# Patient Record
Sex: FEMALE | Race: WHITE | NOT HISPANIC OR LATINO | URBAN - METROPOLITAN AREA
[De-identification: names, ages, dates, MRNs, and addresses within clinical notes are randomized per-mention and may not be internally consistent; named-entity substitution may affect disease eponyms.]

---

## 2017-08-02 ENCOUNTER — GENERIC CONVERSION - ENCOUNTER (OUTPATIENT)
Dept: OTHER | Facility: OTHER | Age: 32
End: 2017-08-02

## 2018-01-10 NOTE — PSYCH
Progress Note  Individual Psychotherapy 60 min minutes provided today  Goals addressed in session:   GOALS- DEVELOPING A [address] MEDS  Therapist explained to Brennon that Johnny Alvarez is no longer with [facility]  Brennon was upset to hear this and worried that her Focalin, which has been helping her function, will not be prescribed by her new PCP  THerapist suggested that Brennon see a Ojnh Costain these meds to be prescribed  Brennon agreed  Brennon shares that she is not babysitting her Thersa Tobin any more since her sister in law was fired from her job  HOwever, she still has concerns about her ability to care For her children in an appropriate manner  SHe and Gen will be monitoring the situation  Current suicide risk is low   Diagnosis and Treatment Plan explained to patient, patient relates understanding diagnosis and is agreeable to Treatment Plan  Assessment  Mood has been stable, however, Brennon continues to struggle to remain focused to complete tasks since out of her ADD medication  Plan  See in 2 weeks- therapist will refer Brennon to a [facility] practice in hopes she can be seen and treated for her ADD and bipolar  Signatures   Electronically signed by :  Elsy Sellers [place]; Oct  6 0239  9:52PM EST                       (Author)
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
## 2018-01-10 NOTE — PSYCH
Progress Note  Individual Psychotherapy 60 min minutes provided today  Goals addressed in session:   GOALS- processing and exploring how to do self care while working on marriage  Whit's mood is improved, less depressed and more hopeful  Claudeen Mt shares that she and [facility] had a very productive marriage Counseling session last week- they were able to identify the pattern of their miscommunication  [facility] was able able to accept that it was not time yet for Claudeen Mt and the kids to move back home  Claudeen Mt shares that she realized how much her mother and her childhood has impacted her current state of anxiety and depression  Explored with Claudeen Mt some of the difficulties she had to face as a child with her mom who "wasn't paying attention"  Claudeen Mt identifies that she wants to do more work on her childhood trauma  Current suicide risk is low   Assessment  Mood is stable in spite of significant external stress  Claudeen Mt has gained insight into some of her feelings that are related to her childhood  Plan  See weekly- Claudeen Mt will also continue to attend marital therapy with [facility] at this time  Signatures   Electronically signed by :  Kanika Hylton [place]; Jan 27 4248 10:11PM EST                       (Author)
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
## 2018-01-13 NOTE — PSYCH
Progress Note  Individual Psychotherapy 60 min minutes provided today  Goals addressed in session:   GOALS- Healthy Boundaries and self-care  Joey Winn updates therapist on the events which took place up to and then after the birth Khadijah Winn shares how much it helped to being back on Latuda right after giving birth- that her mood has been stable as a result  Joey Winn has also been taking a medication for her ADD, which has also been very helpful in staying focused and being able to follow through n tasks  Joey Winn shares that she was at first prescribed this 2X/day but then it wasn't approved except for 1X/day  However, Joey Winn has found that she need to take this 2X/day to be effective, as was originally prescribed  Therapist will discuss with Eve Coley  Joey Winn shares about how well she has been handling a lot of stressors, which is an indiction that the medication is really working  Joey Winn shares that she and Jamar Garnerila have decided that need to help out his sister, Albaro Liu, and her children, due to her recent break up and her failing at taking care of them  This has caused Joey Winn a lot of stress, however, she states she is up for the task  What Joey Winn is concerned about is at one point do they confront Albaro Liu and insist on her making changes  Discussed options  Current suicide risk is low   Assessment  Mood is stable with some anxiety related to circumstances  Plan  Agreed that biwkly sessions are sufficient- will develop a new TX plan in next session  Signatures   Electronically signed by :  Whitney Johnson; Sep 24 7310  9:20PM EST                       (Author)
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
## 2018-01-14 NOTE — PSYCH
Progress Note  Individual Psychotherapy 60 min minutes provided today  Goals addressed in session:   GOALS- Healthy boundaries and self-care  [facility] that [facility] is continuing to address concerns that arise in the week, in the marital sessions, and this is going well  However, Cherry Aquino is continuing to communicate with other women and is justifying it  [facility] shares that the marital therapist has been able to confront Cherry Aquino appropriately, which is validating and relieving for [facility]  [facility] has also learned not to take Franki's sexual obsessions with other women personally  [facility] discusses issues with her mother which still impact her- she recalls how her mom was not present for her emotionally and ow her needs when unmet, as a result  Agreed to focus more on these issues in therapy  Assessment   Mood is stable- Mary Lou Haylee will talk with her gyn about prescribing [place] to take right after giving birth- as per her PCP- the gyn will need to make this decision  Plan  See weekly up until she gives birth  Signatures   Electronically signed by :  Jaycob Tong [place]; May  6 2422  9:30PM EST                       (Author)

## 2018-01-14 NOTE — PSYCH
Progress Note  Individual Psychotherapy 60 min minutes provided today  Goals addressed in session:   GOALS- CONTINUING WITH SELF CARE WHILE WORKING ON MARRIAGE  Rubenss affect is bright and she reports feeling good, emotionally and better physically  Mary Lou Salomon has been improving her fluid intake and eating better, and she is noticing a difference  [place] shares that things are still going well with Lis Rod, since has moved back home  Mary Lou Salomon shares that there were a number of examples in which Lis Rod demonstrated being sensitive and took responsibility for his action, when there were minor conflicts which arose  Mary Lou Salomon relays that Lis Marcel respected her wishes about not interacting w his parents at this time, and he did not push this issue or blame her  Mary Lou Salomon shares that she is ready to work on other issues from her childhood at this point, as Mary Lou Salomon feels confident in she and agustina to address issues in their relationship in their marital therapy sessions  Mary Lou Salomon identifies a lot of anger towards her mother and her neglect growing up  Current suicide risk is low   Assessment   Whit's mood is stable and she the marriage is stable enough that she is able to redirect her focus on her childhood issues  Plan   See in 1 week- begin to prepare to process events from childhood  Signatures   Electronically signed by :  Whitney Rosales; Feb 28 7372  9:04PM EST                       (Author)

## 2018-01-15 NOTE — PSYCH
Progress Note  Individual Psychotherapy minutes provided today  Goals addressed in session:   [address] and completing tasks  Erlinda Tom shares that things have been going generally well between she and Johnson Vizcaino- they are making progress as they attend marital therapy  Erlinda Tom shares that she is struggling to get things done, and she describes her pattern of becoming distracted and not being able to focus and complete tasks- she then becomes overwhelmed as she starts several tasks and does not complete any- for this reason-all tasks seem overwhelming as she knows how her attempts will fail  Erlinda Tom identifies this issue as related to her ADD, which she did find effective medication for however, this is not an option while she is pregnant  Therapist led Erlinda Tom into an exercise in which she broke down her goals into smaller, specific steps, she lowered her expectations, and she gave herself extra time to meet each step  Therapist helped Erlinda Tom identify her tendency to image the "final goal" as something grander than is reasonable and unattainable  Erlinda Tom felt better after discussing this issue and breaking down a possible process to use to meet her goals  Current suicide risk is low   Assessment    1  ADHD (attention deficit hyperactivity disorder), combined type (314 01) (F90 2)    Mood is stable except for feeling frustrated with her ADD symptoms  Erlinda Tom is able to identify the difference between mert and her lack of motivation due to issues with her ADD and she affirms that she she is not depressed  Plan   See in 1week- follow up with plan to use this process this week  Signatures   Electronically signed by :  Yusuf Bourne [place]; Mar  4 7953 10:08PM EST                       (Author)
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
## 2018-01-16 NOTE — PSYCH
Progress Note  Individual Psychotherapy 60 min minutes provided today  Goals addressed in session:   GOALS- Setting healthy boundaries  Yudi Doll shares that she has been feeling very anxious lately and overwhelmed- she is unsure if it is her meds or her situation  Yudi Doll shares that the appt with the psychiatrist went very well, and she is now on [place], Focalin, and Trazodone For sleep  Yudi Doll shares how her sister in law is still not properly caring For the children, and Yudi Doll is the one who is taking on extra responsibility with her children to be sure that they are dressed Properly and paid attention to  Yudi Doll is feeling frustrated and torn about what to do- she fears that [facility] not allow her to see the children if they call CYF on her  Discussed the poss of having an intervention with the rest of the family- Yudi Doll will talk to Jorge Peterson about this  IN the meantime, therapist affirmed to Yudi Doll that she may need to become less involved o that she does not end up in an unhealthy emotioanl state herself  Current suicide risk is low   Assessment  Mood is anxious and angry about situation  Plan  See in2 weeks- support Yudi Doll being assertive with Jorge Peterson and his Family about limitations for her own emotional welfare  Signatures   Electronically signed by :  Whitney Torre; Nov 4 3375  9:38PM EST                       (Author)
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
## 2018-03-07 NOTE — PSYCH
Treatment Plan    Date of Initial Treatment Plan: 1/19/2015  Date of Current Treatment Plan: 5/11/2016  Treatment Plan 4  Strengths/Personal Resources for Self Care: RESILIENT, RESOURCEFUL, LOVE FOR FAMILY, JANAY, Perseverant, INSIGHTFUL, CARING,  HARD WORKER, DETERMINED, INTELLIGENT, GREAT WITH CHILDREN  Diagnosis:   Axis I: BIPOLAR DISORDER, ANXIETY DISORDER, NOS   Axis II: NONE   Axis III: pregnant     Current Challenges/Problems/Needs: UNRESOLVED ISSUES IN MARRIAGE, EXTREME FATIGUE, SOCIAL ANXIETY,  SOME UNRESOLVED ISSUES FROM CHILDHOOD, HISTORY OF LABILE MOODS  Long Term Goals:   "TO LIVE DAY TO DAY COMFORTABLY WITH MY MOODS/ AND WITH THE RIGHT MEDICATION"  TO BE ABLE TO FINISH TASKS MOST OF THE TIME   Target Date: 12/31/2016      SET AND ENFORCE HEALTHY BOUNDARIES IN MARRIAGE AND WITH OTHER FAMILY MEMBERS   TAKE STEPS FOR SELF- CARE AS NEEDED   Target Date: 12/31/2016      ADDRESS ISSUES FROM PAST AS NEEDED, TAKE STEPS TOWARDS RESOLUTION  ADDRESS ISSUES FROM PAST AS NEEDED   Target Date: 12/31/2016      Short Term Objectives:   Goal 1:   THERAPIST AND PCP WITH COMMUNICATE WITH GYM ABOUT RESUMING MEDICATION ASAP AFTER BIRTH  CHAS AND THERAPIST WILL ASSESS ADD SYMPTOMS AND DISCUSSES RESUMING   CONTINUE TO SET AND ACCOMPLISH ONE GOAL FOR THE DAY  Target Date: 12/31/2016      Goal 2:   CONTINUE TO REMAIN CALM ABOUT ISSUES WITH JOSE AND HOLD HIM ACCOUNTABLE- ADDRESS ISSUES WITH MARITAL THERAPIST AS NEEDED  FIND WAYS TO IMPROVE SELF ESTEEM  Target Date: 12/31/2016      Goal 3:   ADDRESSING CHILDHOOD TRAUMA SO THAT WHEN TALKING TO MOM, STRESS LEVEL IS A 5 OR LESS MOST OF THE TIME  Target Date: 12/31/2016      GOAL 1: Modality: Individual 4 x per month Target Date: 59/11/2016         GOAL 2: Modality: Individual 4 x per month Target Date: 9/11/206         GOAL 3: Modality: Individual 4 x per month Target Date: 9/11/2016         The first scheduled review date is 9/11/2016     The expected length of service is 12-18 MONTHS  Patient Signature: _________________________________ Date/Time: ______________      Active Problems    1  Abdominal pain, left upper quadrant (789 02) (R10 12)   2  Abdominal pregnancy (633 00) (O00 0)   3  ADHD (attention deficit hyperactivity disorder), combined type (314 01) (F90 2)   4  Allergic rhinitis (477 9) (J30 9)   5  Bipolar affective disorder, rapid cycling (296 80) (F31 9)   6  Birth Control Method - Oral Contraceptives   7  Choledocholithiasis (574 50) (K80 50)   8  Depression (311) (F32 9)   9  Depression with anxiety (300 4) (F41 8)   10  Fatigue (780 79) (R53 83)   11  Flu vaccine need (V04 81) (Z23)   12  Hypoglycemia (251 2) (E16 2)   13  Sinusitis (473 9) (J32 9)   14  Streptococcal infection (041 00) (A49 1)   15  Streptococcal pharyngitis (034 0) (J02 0)   16  Vitamin D deficiency (268 9) (E55 9)      ADHD (attention deficit hyperactivity disorder), combined type (314 01) (F90 2)          Signatures   Electronically signed by :  Whitney Rosales; May 11 0034 10:51AM EST                       (Author)

## 2018-03-07 NOTE — PSYCH
Treatment Plan    Date of Initial Treatment Plan: 1/19/2015  Date of Current Treatment Plan: 2/24/2016  Treatment Plan 3  Strengths/Personal Resources for Self Care: RESILIENT, RESOURCEFUL, LOVE FOR FAMILY, JANAY, Perseverant, INSIGHTFUL, CARING,  HARD WORKER, DETERMINED, INTELLIGENT, GREAT WITH CHILDREN  Diagnosis:   Axis I: BIPOLAR DISORDER, ANXIETY DISORDER, NOS   Axis II: NONE   Axis III: pregnant     Current Challenges/Problems/Needs: UNRESOLVED ISSUES IN MARRIAGE, EXTREME FATIGUE, SOCIAL ANXIETY,  SOME UNRESOLVED ISSUES FROM CHILDHOOD, HISTORY OF LABILE MOODS  Long Term Goals:   "TO FEEL/NORMAL ON OR OFF THE MEDS"  TO BE ABLE TO FINISH TASKS MOST OF THE TIME   Target Date: 9/13/2016      SET AND ENFORCE HEALTHY BOUNDARIES IN MARRIAGE AND WITH OTHER FAMILY MEMBERS   TAKE STEPS FOR SELF- CARE AS NEEDED   Target Date: 9/13/2016      ADDRESS ISSUES FROM PAST AS NEEDED, TAKE STEPS TOWARDS RESOLUTION  ADDRESS ISSUES FROM PAST AS NEEDED   Target Date: 9/13/2016      Short Term Objectives:   Goal 1:   CONTINUE TO WORK WITH HEALTH CARE PROVIDERS TO IMPROVE HEALTH  FOLLOW UP WITH APPTS WITH PCP AND GYN AND RECOMMENDED TESTS  INCREASE INTAKE OF PROTEIN AND WATER AND BEGIN TO EXERCISE ON A REGULAR BASIS  IDENTIFY GOALS AND WRITE SPECIFIC STEPS AND IDENTIFY A DEADLINE  Target Date: 1/13/17   Completion Date: Mostly accomplished 4/2016     Goal 2:   CONTINUE WITH MARITAL THERAPY AS NEEDED  FIND WAYS TO IMPROVE SELF ESTEEM  Target Date: 1/13/17   Completion Date: martial therapy goal accomplisched/ slef- esteem contionue goal 5/2016     Goal 3:   DECIDE ON SPECIFIC TRAUMATIC ISSUES TO ADDRESS IN THERAPY AND BEGIN PROCESS     Target Date: 1/13/17   Completion Date: ongoing      GOAL 1: Modality: Individual 4 x per month Target Date: 5/13/16         GOAL 2: Modality: Individual 4 x per month Target Date: 5/13/16         GOAL 3: Modality: Individual 4 x per month Target Date: 5/13/16         The first scheduled review date is 5/13/16  The expected length of service is 12-18 MONTHS  Level of functioning at initial assessment: 65  The highest level of functioning in the past year was 72  The current level of functioning is 65  Patient Signature: _________________________________ Date/Time: ______________      Active Problems     1  Abdominal pain, left upper quadrant (789 02) (R10 12)   2  Abdominal pregnancy (633 00) (O00 0)   3  Allergic rhinitis (477 9) (J30 9)   4  Bipolar affective disorder, rapid cycling (296 80) (F31 9)   5  Birth Control Method - Oral Contraceptives   6  Choledocholithiasis (574 50) (K80 50)   7  Depression (311) (F32 9)   8  Depression with anxiety (300 4) (F41 8)   9  Fatigue (780 79) (R53 83)   10  Flu vaccine need (V04 81) (Z23)   11  Hypoglycemia (251 2) (E16 2)   12  Sinusitis (473 9) (J32 9)   13  Streptococcal infection (041 00) (A49 1)   14  Streptococcal pharyngitis (034 0) (J02 0)   15  Vitamin D deficiency (268 9) (E55 9)    ADHD (attention deficit hyperactivity disorder), combined type (314 01) (F90 2)          Signatures   Electronically signed by : Whitney Ochoa; Mar  9 3350 10:56AM EST                       (Author)    Electronically signed by :  Whitney Ochoa; May 11 4477 10:15AM EST                       (Author)

## 2018-03-07 NOTE — PSYCH
Treatment Plan Tracking    #1 Treatment Plan not completed within required time limits due to: Other: Client was seen for the first time in 4 months on 9/21/2016, due to pregnancy and scheduloing issues  It was clinically more apporpriate to become updated on events over the last 4 months and wait until the next appt to complete the [address]             Signatures   Electronically signed by :  Debbora Dakin, [place]; Oct  5 1535 12:40PM EST                       (Author)
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
## 2018-03-07 NOTE — PSYCH
Treatment Plan Tracking    #1 Treatment Plan not completed within required time limits due to: Other: [address] NOT COMPLETED WHEN DUE ON 9/11/16, DUE TO LAPSE BETWEEN MAY AND SEPTEMBER DUE TO PREGNANCY- HTE 1ST APPT AFTER [facility] ON 9/21/16 WAS NEEDED TO BECOME UPDATED ON ISSUES AND CONCERNS, AS OF 10/5/16- RESET 4 MONTHS  Signatures   Electronically signed by :  Whitney Carl; Oct 31 1191  3:37PM EST                       (Author)

## 2018-03-07 NOTE — PSYCH
Treatment Plan    Date of Initial Treatment Plan: 1/19/2015  Date of Current Treatment Plan: 10/5/2016  Treatment Plan 5  Strengths/Personal Resources for Self Care: RESILIENT, RESOURCEFUL, LOVE FOR FAMILY, JANAY, Perseverant, INSIGHTFUL, CARING,  HARD WORKER, DETERMINED, INTELLIGENT, GREAT WITH CHILDREN  Diagnosis:   Axis I: BIPOLAR DISORDER, ANXIETY DISORDER, NOS   Axis II: NONE       Long Term Goals:   "TO LIVE DAY TO DAY COMFORTABLY WITH MY MOODS/ AND WITH THE RIGHT MEDICATION"  TO BE ABLE TO FINISH TASKS MOST OF THE TIME   Target Date: 6/11/2017      SET AND ENFORCE HEALTHY BOUNDARIES IN MARRIAGE AND WITH OTHER FAMILY MEMBERS   TAKE STEPS FOR SELF- CARE AS NEEDED   Target Date: 6/11/2017      ADDRESS ISSUES FROM PAST AS NEEDED, TAKE STEPS TOWARDS RESOLUTION  ADDRESS ISSUES FROM PAST AS NEEDED   Target Date: 6/11/2017    Short Term Objectives:   Goal 1:   FOLLOW THROUGH WITH OBTAINING A PSYCHIATRIST TO CONTINUE WITH EFFECTIVE MEDS  CHAS WILL WRITE DOWN ON BOARD IN KITCHEN AT LEAST 1-2 SPECIFIC THINGS TO ACCOMPLISH EACH DAY  Target Date: 1/11/2017      Goal 2:   CONTINUE TO ADDRESS WITH ISSUES IN MARRIAGE AS THEY ARISE  CONTINUE TO USE COPING SKILLS LEARNED IN MARITAL THERAPY  SET AND ENFORCE SPECIFIC EXPECTATIONS WITH OTHER FAMILY MEMBERS  START TO EXERCISE AT LEAST 2x/WEEK  Target Date: 1/11/2017      Goal 3:   ADDRESSING CHILDHOOD TRAUMA SO THAT WHEN TALKING TO MOM, STRESS LEVEL IS A 5 OR LESS MOST OF THE TIME  TRY USING EMDR TO ADDRESS PAST TRAUMA  Target Date: 1/11/2017      GOAL 1: Modality: Individual 2 x per month Target Date: 1/11/2017         GOAL 2: Modality: Individual 2 x per month Target Date: 1/11/2017         GOAL 3: Modality: Individual 2 x per month Target Date: 1/11/2017             The first scheduled review date is 1/11/2017  The expected length of service is 12-18 MONTHS                 Patient Signature: _________________________________ Date/Time: ______________ Active Problems    1  Abdominal pain, left upper quadrant (789 02) (R10 12)   2  Abdominal pregnancy (633 00) (O00 00)   3  ADHD (attention deficit hyperactivity disorder), combined type (314 01) (F90 2)   4  Allergic rhinitis (477 9) (J30 9)   5  Bipolar affective disorder, rapid cycling (296 80) (F31 9)   6  Birth Control Method - Oral Contraceptives   7  Choledocholithiasis (574 50) (K80 50)   8  Depression (311) (F32 9)   9  Depression screen (V79 0) (Z13 89)   10  Depression with anxiety (300 4) (F41 8)   11  Fatigue (780 79) (R53 83)   12  Flu vaccine need (V04 81) (Z23)   13  Hypoglycemia (251 2) (E16 2)   14  Sinusitis (473 9) (J32 9)   15  Streptococcal infection (041 00) (A49 1)   16  Streptococcal pharyngitis (034 0) (J02 0)   17  Vitamin D deficiency (268 9) (E55 9)        ADHD (attention deficit hyperactivity disorder), combined type (314 01) (F90 2)          Signatures   Electronically signed by :  Whitney Nance; Oct  5 9600 10:50AM EST                       (Author)

## 2020-08-03 ENCOUNTER — OFFICE VISIT (OUTPATIENT)
Dept: URGENT CARE | Facility: CLINIC | Age: 35
End: 2020-08-03
Payer: COMMERCIAL

## 2020-08-03 VITALS
BODY MASS INDEX: 32.78 KG/M2 | WEIGHT: 185 LBS | RESPIRATION RATE: 16 BRPM | HEART RATE: 82 BPM | HEIGHT: 63 IN | OXYGEN SATURATION: 99 % | TEMPERATURE: 97.8 F

## 2020-08-03 DIAGNOSIS — Z20.822 SUSPECTED COVID-19 VIRUS INFECTION: Primary | ICD-10-CM

## 2020-08-03 DIAGNOSIS — J20.9 ACUTE BRONCHITIS, UNSPECIFIED ORGANISM: ICD-10-CM

## 2020-08-03 PROCEDURE — G0382 LEV 3 HOSP TYPE B ED VISIT: HCPCS | Performed by: NURSE PRACTITIONER

## 2020-08-03 PROCEDURE — U0003 INFECTIOUS AGENT DETECTION BY NUCLEIC ACID (DNA OR RNA); SEVERE ACUTE RESPIRATORY SYNDROME CORONAVIRUS 2 (SARS-COV-2) (CORONAVIRUS DISEASE [COVID-19]), AMPLIFIED PROBE TECHNIQUE, MAKING USE OF HIGH THROUGHPUT TECHNOLOGIES AS DESCRIBED BY CMS-2020-01-R: HCPCS | Performed by: NURSE PRACTITIONER

## 2020-08-03 PROCEDURE — S9083 URGENT CARE CENTER GLOBAL: HCPCS | Performed by: NURSE PRACTITIONER

## 2020-08-03 RX ORDER — ALBUTEROL SULFATE 90 UG/1
2 AEROSOL, METERED RESPIRATORY (INHALATION) EVERY 4 HOURS PRN
Qty: 18 G | Refills: 1 | Status: SHIPPED | OUTPATIENT
Start: 2020-08-03 | End: 2021-04-16 | Stop reason: SDDI

## 2020-08-03 RX ORDER — DEXMETHYLPHENIDATE HYDROCHLORIDE 40 MG/1
1 CAPSULE, EXTENDED RELEASE ORAL DAILY
COMMUNITY
Start: 2016-08-29 | End: 2021-04-16 | Stop reason: SDDI

## 2020-08-03 RX ORDER — LURASIDONE HYDROCHLORIDE 40 MG/1
1 TABLET, FILM COATED ORAL DAILY
COMMUNITY
Start: 2020-07-21 | End: 2021-04-16 | Stop reason: SDDI

## 2020-08-03 NOTE — LETTER
August 3, 2020     Patient: Jeremy Gr   YOB: 1985   Date of Visit: 8/3/2020       To Whom It May Concern: It is my medical opinion that Ebony Backer should remain out of work until [employer] received  If you have any questions or concerns, please don't hesitate to call           Sincerely,        JUANIS Rivera    CC: No Recipients

## 2020-08-03 NOTE — PROGRESS NOTES
[address] [employer] [address]        NAME: Yandy Castro is a 36 y o  female  : 1985    MRN: [id number]  DATE: August 3, 2020  TIME: 12:03 PM    Assessment and Plan   Suspected Covid-19 Virus Infection [Z20 828]  1  Suspected Covid-19 Virus Infection  albuterol (Ventolin HFA) 90 mcg/act inhaler    Novel Coronavirus (COVID-19), PCR [facility] - Office Collection   2  Acute bronchitis, unspecified organism  albuterol (Ventolin HFA) 90 mcg/act inhaler    Novel Coronavirus (COVID-19), PCR [facility] - Office Collection         Patient Instructions     Patient Instructions     Treat yourself as if you have covid-19 until told otherwise  We do call results, but using [employer] to view results is often the easiest and fastest way (you can set up email notifications for new results)--see the handout to register  If you need to seek additional care (PCP, ER, 911), call ahead/call from parking lot and let them know you have been tested (this lets them wear more protection and take you directly to the exam room once ready)  Use the albuterol inhaler every 4 hours as needed for shortness of breath, chest tightness, wheezing or persistent cough  Since you rated your shortness of breath 2/10, the inhaler may be enough  If your symptoms worsen, you may need an antibiotic as discussed  Novel Coronavirus   AMBULATORY CARE:   The novel coronavirus (nCoV)  is a new strain (type) of coronavirus  Coronaviruses often cause mild respiratory (lung) infections, such as the common cold  They can also cause more severe infections  Examples include acute respiratory syndrome (SARS), Middle East respiratory syndrome (MERS), pneumonia, and bronchitis  The nCoV can cause more severe symptoms than earlier coronaviruses  The nCoV was first found in individuals in part of [place] at the end of   The virus is spreading as people who are infected travel to other parts of the world    Signs and symptoms of nCoV  can take 2 to 14 days to develop and range from mild to severe:  · Fever    · Cough    · Shortness of breath or trouble breathing    · Pneumonia (in severe cases)    Call your local emergency number (911 in the [address]) for any of the following:  Tell the  you may have nCoV  This will help anyone in the ambulance stay safe, and to call ahead to the hospital   · You have sudden shortness of breath  · You have a fast heartbeat or chest pain  Seek care immediately if:   · You feel so dizzy that you have trouble standing up  · Your lips and fingernails turn blue  Call your doctor if:   · You have a fever over 102ºF (39ºC)  · Your sore throat gets worse or you see white or yellow spots in your throat  · Your symptoms get worse after 3 to 5 days or your cold is not better in 14 days  · You have a rash anywhere on your skin  · You have large, tender lumps in your neck  · You have thick, green, or yellow drainage from your nose  · You cough up thick yellow, green, or bloody mucus  · You are vomiting for more than 24 hours and cannot keep fluids down  · You have a bad earache  · You have questions or concerns about your condition or care  How nCoV spreads: It is not known for sure how the virus spreads  The virus may spread in droplets when an infected person coughs or sneezes  Others become infected by breathing in the virus or getting the virus in their eyes  The virus can also possibly spread if a person touches certain animals, such as in a live market  If you develop symptoms of nCoV,  you may need to be checked  Call your doctor if you traveled within the past 14 days to an area where nCoV is active  Call your doctor if you have close contact with someone else who did  Your doctor will tell you what to do  He or she will need to take precautions in the office to protect staff members and other patients  Your doctor will take samples from your airway   The samples have to be sent to the [facility] for Disease Control and Prevention (CDC) to be tested  What to do if you have nCoV:  No treatment is available for nCoV  Medicine may be given to help relieve your cough or fever, or to help you breathe more easily  Severe nCoV may need to be treated in the hospital  In the hospital, you may need breathing treatment or support, such as extra oxygen  The following can help you prevent spreading nCoV to others  Have anyone you are caring for who has nCoV also use the guidelines  · Limit close contact with others  Stay in a different room, or sleep in a separate bed  Remind others to stay at least 6 feet (2 meters) away from you while you are sick  Only go out of your house if you are going to a medical appointment  While you are recovering, always call the office of any healthcare provider you seeing  The provider will need to prepare for your arrival to keep others safe  · Wear a medical mask when you are around others  A medical mask will help prevent you from spreading the virus  You can ask others around you to wear a medical mask if you are not able to wear one  · Cover your mouth and nose to sneeze or cough  Sneeze and cough into a tissue that covers your mouth and nose  Use the bend of our arm if you do not have a tissue  Throw the tissue away in a trash can right away  Then wash your hands well with soap and water  Use hand  that contains alcohol if you cannot wash your hands  · Wash your hands often  You and everyone in your home must wash your hands throughout the day  Use soap and water  Use germ-killing gel if soap and water are not available  Do not touch your eyes or mouth if you have not washed your hands  · Do not share items with other people  Do not share dishes, towels, or other items with anyone  Always wash items after you use them  · Clean frequently touched surfaces often    Use household  or bleach diluted with water to clean counters, doorknobs, toilet seats, and other surfaces  · Do not handle live animals  You may be able to spread nCoV to animals, including pets  Until more is known, it is best not to touch, play with, or handle live animals while you are sick  Help relieve mild symptoms:   · Drink more liquids as directed  Liquids will help thin and loosen mucus so you can cough it up  Liquids will also help prevent dehydration  Liquids that help prevent dehydration include water, fruit juice, and broth  Do not drink liquids that contain caffeine  Caffeine can increase your risk for dehydration  Ask your healthcare provider how much liquid to drink each day  · Soothe a sore throat  Gargle with warm salt water  This helps your sore throat feel better  Make salt water by dissolving ¼ teaspoon salt in 1 cup warm water  You may also suck on hard candy or throat lozenges  You may use a sore throat spray  · Use a humidifier or vaporizer  Use a cool mist humidifier or a vaporizer to increase air moisture in your home  This may make it easier for you to breathe and help decrease your cough  · Use saline nasal drops as directed  These help relieve congestion  · Apply petroleum-based jelly around the outside of your nostrils  This can decrease irritation from blowing your nose  · Do not smoke  Nicotine and other chemicals in cigarettes and cigars can make your symptoms worse  They can also cause infections such as bronchitis or pneumonia  Ask your healthcare provider for information if you currently smoke and need help to quit  E-cigarettes or smokeless tobacco still contain nicotine  Talk to your healthcare provider before you use these products  Follow up with your doctor as directed:  Write down your questions so you remember to ask them during your visits  © Copyright [address] Information is for End User's use only and may not be sold, redistributed or otherwise used for commercial purposes   All illustrations and images included in [employer] are the copyrighted property of [employer]  or [facility] Drew Ramirez   The above information is an  only  It is not intended as medical advice for individual conditions or treatments  Talk to your doctor, nurse or pharmacist before following any medical regimen to see if it is safe and effective for you  Acute Bronchitis   AMBULATORY CARE:   Acute bronchitis  is swelling and irritation in the air passages of your lungs  This irritation may cause you to cough or have other breathing problems  Acute bronchitis often starts because of another illness, such as a cold or the flu  The illness spreads from your nose and throat to your windpipe and airways  Bronchitis is often called a chest cold  Acute bronchitis lasts about 3 to 6 weeks and is usually not a serious illness  Your cough can last for several weeks  You may have any of the following symptoms:   · A cough with sputum that may be clear, yellow, or green    · Feeling more tired than usual, and body aches    · A fever and chills    · Wheezing when you breathe    · A tight chest or pain when you breathe or cough  Seek care immediately if:   · You cough up blood  · Your lips or fingernails turn blue  · You feel like you are not getting enough air when you breathe  Contact your healthcare provider if:   · You have a fever  · Your breathing problems do not go away or get worse  · Your cough does not get better within 4 weeks  · You have questions or concerns about your condition or care  Self-care:   · Get more rest   Rest helps your body to heal  Slowly start to do more each day  Rest when you feel it is needed  · Avoid irritants in the air  Avoid chemicals, fumes, and dust  Wear a face mask if you must work around dust or fumes  Stay inside on days when air pollution levels are high  If you have allergies, stay inside when pollen counts are high   Do not use aerosol products, such as spray-on deodorant, bug spray, and hair spray  · Do not smoke or be around others who smoke  Nicotine and other chemicals in cigarettes and cigars damages the cilia that move mucus out of your lungs  Ask your healthcare provider for information if you currently smoke and need help to quit  E-cigarettes or smokeless tobacco still contain nicotine  Talk to your healthcare provider before you use these products  · Drink liquids as directed  Liquids help keep your air passages moist and help you cough up mucus  You may need to drink more liquids when you have acute bronchitis  Ask how much liquid to drink each day and which liquids are best for you  · Use a humidifier or vaporizer  Use a cool mist humidifier or a vaporizer to increase air moisture in your home  This may make it easier for you to breathe and help decrease your cough  Prevent acute bronchitis by doing the following:   · Get the vaccinations you need  Ask your healthcare provider if you should get vaccinated against the flu or pneumonia  · Prevent the spread of germs  You can decrease your risk of acute bronchitis and other illnesses by doing the following:     [facility] your hands often with soap and water  Carry germ-killing hand lotion or gel with you  You can use the lotion or gel to clean your hands when soap and water are not available  ¨ Do not touch your eyes, nose, or mouth unless you have washed your hands first     ¨ Always cover your mouth when you cough to prevent the spread of germs  It is best to cough into a tissue or your shirt sleeve instead of into your hand  Ask those around you cover their mouths when they cough  ¨ Try to avoid people who have a cold or the flu  If you are sick, stay away from others as much as possible  Medicines: Your healthcare provider may  give you any of the following:  · Ibuprofen or acetaminophen  are medicines that help lower your fever  They are available without a doctor's order   Ask your healthcare provider which medicine is right for you  Ask how much to take and how often to take it  Follow directions  These medicines can cause stomach bleeding if not taken correctly  Ibuprofen can cause kidney damage  Do not take ibuprofen if you have kidney disease, an ulcer, or allergies to aspirin  Acetaminophen can cause liver damage  Do not take more than 4,000 milligrams in 24 hours  · Decongestants  help loosen mucus in your lungs and make it easier to cough up  This can help you breathe easier  · Cough suppressants  decrease your urge to cough  If your cough produces mucus, do not take a cough suppressant unless your healthcare provider tells you to  Your healthcare provider may suggest that you take a cough suppressant at night so you can rest     · Inhalers  may be given  Your healthcare provider may give you one or more inhalers to help you breathe easier and cough less  An inhaler gives your medicine to open your airways  Ask your healthcare provider to show you how to use your inhaler correctly  Follow up with your healthcare provider as directed:  Write down questions you have so you will remember to ask them during your follow-up visits  © 2017 [address] [facility] Information is for End User's use only and may not be sold, redistributed or otherwise used for commercial purposes  All illustrations and images included in CareNotes® are the copyrighted property of [employer]  or [employer]  The above information is an  only  It is not intended as medical advice for individual conditions or treatments  Talk to your doctor, nurse or pharmacist before following any medical regimen to see if it is safe and effective for you  Follow up with PCP in 3-5 days  Proceed to  ER if symptoms worsen      Chief Complaint     Chief Complaint   Patient presents with    Cold Like Symptoms     Patient c/o sore throat, runny nose, dry cough, and chest congestion x 2 days   Patient denies any known exposure to Covid-19  History of Present Illness       Patient seen at car side  Patient began feeling ill on Friday  No fever  She reports a mildly sore throat, runny nose, dry nonproductive cough and some chest tightness  She reports that she will have brief coughing fits every 20-30 minutes  She is a current smoker and also reports a history of childhood asthma but states that she outgrew it and has not needed an inhaler in a long time  We did review proper inhaler use  She rates her shortness of breath as occasional a mild, 2/10  No loss of smell or taste  She denies any specific sick contacts or contact with known COVID-19 but acknowledges working in a warehouse setting  She states that they have been social distance thing but staff usually walk around with the masks only over there mouths and their noses out  Review of Systems   Review of Systems   Constitutional: Positive for fatigue  Negative for chills and fever  HENT: Positive for congestion, rhinorrhea and sore throat  Negative for sinus pressure and sinus pain  Respiratory: Positive for cough, chest tightness and shortness of breath  Negative for wheezing  Gastrointestinal: Negative  Musculoskeletal: Negative for arthralgias and myalgias  All other systems reviewed and are negative          Current Medications       Current Outpatient Medications:     dexmethylphenidate (FOCALIN XR) 40 MG 24 hr capsule, Take 1 capsule by mouth daily, Disp: , Rfl:     Latuda 40 MG tablet, Take 1 tablet by mouth daily, Disp: , Rfl:     albuterol (Ventolin HFA) 90 mcg/act inhaler, Inhale 2 puffs every 4 (four) hours as needed for wheezing or shortness of breath, Disp: 18 g, Rfl: 1    Current Allergies     Allergies as of 08/03/2020 - Reviewed 08/03/2020   Allergen Reaction Noted    Cephalexin  02/28/2015    Codeine  08/13/2013            The following portions of the patient's history were reviewed and updated as appropriate: allergies, current medications, past family history, past medical history, past social history, past surgical history and problem list      Past Medical History:   Diagnosis Date    ADHD (attention deficit hyperactivity disorder)     Bipolar 1 disorder ([facility] [address] )     Depression        Past Surgical History:   Procedure Laterality Date     SECTION         No family history on file  Medications have been verified  Objective   Pulse 82   Temp 97 8 °F (36 6 °C)   Resp 16   Ht 5' 3"   Wt 83 9 kg (185 lb)   SpO2 99%   BMI 32 77 kg/m²        Physical Exam     Physical Exam   Constitutional: She is oriented to person, place, and time  She appears well-developed  Non-toxic appearance  She appears ill  No distress  HENT:   Head: Normocephalic and atraumatic  Eyes: Pupils are equal, round, and reactive to light  Neck: Normal range of motion  Neck supple  Cardiovascular: Normal rate, S1 normal, S2 normal and normal heart sounds  No extrasystoles are present  Exam reveals no gallop and no friction rub  No murmur heard  Pulmonary/Chest: Effort normal and breath sounds normal  There is normal air entry  No accessory muscle usage or stridor  No tachypnea and no bradypnea  No respiratory distress  Expiration is no prolonged expiration  Air movement is not decreased  She has no decreased breath sounds  She has no wheezes  She has no rhonchi  She has no rales  She exhibits no retraction  Abdominal: Soft  Bowel sounds are normal  She exhibits no distension  Musculoskeletal: Normal range of motion  Neurological: She is alert and oriented to person, place, and time  Skin: Skin is warm and dry  Capillary refill takes less than 2 seconds  She is not diaphoretic  Psychiatric: Her behavior is normal  Judgment and thought content normal    Nursing note and vitals reviewed

## 2020-08-03 NOTE — PATIENT INSTRUCTIONS
Treat yourself as if you have covid-19 until told otherwise  We do call results, but using [employer] to view results is often the easiest and fastest way (you can set up email notifications for new results)--see the handout to register  If you need to seek additional care (PCP, ER, 911), call ahead/call from parking lot and let them know you have been tested (this lets them wear more protection and take you directly to the exam room once ready)  Use the albuterol inhaler every 4 hours as needed for shortness of breath, chest tightness, wheezing or persistent cough  Since you rated your shortness of breath 2/10, the inhaler may be enough  If your symptoms worsen, you may need an antibiotic as discussed  Novel Coronavirus   AMBULATORY CARE:   The novel coronavirus (nCoV)  is a new strain (type) of coronavirus  Coronaviruses often cause mild respiratory (lung) infections, such as the common cold  They can also cause more severe infections  Examples include acute respiratory syndrome (SARS), Middle East respiratory syndrome (MERS), pneumonia, and bronchitis  The nCoV can cause more severe symptoms than earlier coronaviruses  The nCoV was first found in individuals in part of [place] at the end of 2019  The virus is spreading as people who are infected travel to other parts of the world  Signs and symptoms of nCoV  can take 2 to 14 days to develop and range from mild to severe:  · Fever    · Cough    · Shortness of breath or trouble breathing    · Pneumonia (in severe cases)    Call your local emergency number (911 in the [address] [place] [address],[address]) for any of the following:  Tell the  you may have nCoV  This will help anyone in the ambulance stay safe, and to call ahead to the hospital   · You have sudden shortness of breath  · You have a fast heartbeat or chest pain  Seek care immediately if:   · You feel so dizzy that you have trouble standing up  · Your lips and fingernails turn blue      Call your doctor if: · You have a fever over 102ºF (39ºC)  · Your sore throat gets worse or you see white or yellow spots in your throat  · Your symptoms get worse after 3 to 5 days or your cold is not better in 14 days  · You have a rash anywhere on your skin  · You have large, tender lumps in your neck  · You have thick, green, or yellow drainage from your nose  · You cough up thick yellow, green, or bloody mucus  · You are vomiting for more than 24 hours and cannot keep fluids down  · You have a bad earache  · You have questions or concerns about your condition or care  How nCoV spreads: It is not known for sure how the virus spreads  The virus may spread in droplets when an infected person coughs or sneezes  Others become infected by breathing in the virus or getting the virus in their eyes  The virus can also possibly spread if a person touches certain animals, such as in a live market  If you develop symptoms of nCoV,  you may need to be checked  Call your doctor if you traveled within the past 14 days to an area where nCoV is active  Call your doctor if you have close contact with someone else who did  Your doctor will tell you what to do  He or she will need to take precautions in the office to protect staff members and other patients  Your doctor will take samples from your airway  The samples have to be sent to the Centers for Disease Control and Prevention (CDC) to be tested  What to do if you have nCoV:  No treatment is available for nCoV  Medicine may be given to help relieve your cough or fever, or to help you breathe more easily  Severe nCoV may need to be treated in the hospital  In the hospital, you may need breathing treatment or support, such as extra oxygen  The following can help you prevent spreading nCoV to others  Have anyone you are caring for who has nCoV also use the guidelines  · Limit close contact with others  Stay in a different room, or sleep in a separate bed   Remind others to stay at least 6 feet (2 meters) away from you while you are sick  Only go out of your house if you are going to a medical appointment  While you are recovering, always call the office of any healthcare provider you seeing  The provider will need to prepare for your arrival to keep others safe  · Wear a medical mask when you are around others  A medical mask will help prevent you from spreading the virus  You can ask others around you to wear a medical mask if you are not able to wear one  · Cover your mouth and nose to sneeze or cough  Sneeze and cough into a tissue that covers your mouth and nose  Use the bend of our arm if you do not have a tissue  Throw the tissue away in a trash can right away  Then wash your hands well with soap and water  Use hand  that contains alcohol if you cannot wash your hands  · Wash your hands often  You and everyone in your home must wash your hands throughout the day  Use soap and water  Use germ-killing gel if soap and water are not available  Do not touch your eyes or mouth if you have not washed your hands  · Do not share items with other people  Do not share dishes, towels, or other items with anyone  Always wash items after you use them  · Clean frequently touched surfaces often  Use household  or bleach diluted with water to clean counters, doorknobs, toilet seats, and other surfaces  · Do not handle live animals  You may be able to spread nCoV to animals, including pets  Until more is known, it is best not to touch, play with, or handle live animals while you are sick  Help relieve mild symptoms:   · Drink more liquids as directed  Liquids will help thin and loosen mucus so you can cough it up  Liquids will also help prevent dehydration  Liquids that help prevent dehydration include water, fruit juice, and broth  Do not drink liquids that contain caffeine  Caffeine can increase your risk for dehydration   Ask your healthcare provider how much liquid to drink each day  · Soothe a sore throat  Gargle with warm salt water  This helps your sore throat feel better  Make salt water by dissolving ¼ teaspoon salt in 1 cup warm water  You may also suck on hard candy or throat lozenges  You may use a sore throat spray  · Use a humidifier or vaporizer  Use a cool mist humidifier or a vaporizer to increase air moisture in your home  This may make it easier for you to breathe and help decrease your cough  · Use saline nasal drops as directed  These help relieve congestion  · Apply petroleum-based jelly around the outside of your nostrils  This can decrease irritation from blowing your nose  · Do not smoke  Nicotine and other chemicals in cigarettes and cigars can make your symptoms worse  They can also cause infections such as bronchitis or pneumonia  Ask your healthcare provider for information if you currently smoke and need help to quit  E-cigarettes or smokeless tobacco still contain nicotine  Talk to your healthcare provider before you use these products  Follow up with your doctor as directed:  Write down your questions so you remember to ask them during your visits  © Copyright [address] [place] [address] Drive Information is for End User's use only and may not be sold, redistributed or otherwise used for commercial purposes  All illustrations and images included in CareNotes® are the copyrighted property of A D A M , Inc  or [address] [place] [address] James   The above information is an  only  It is not intended as medical advice for individual conditions or treatments  Talk to your doctor, nurse or pharmacist before following any medical regimen to see if it is safe and effective for you  Acute Bronchitis   AMBULATORY CARE:   Acute bronchitis  is swelling and irritation in the air passages of your lungs  This irritation may cause you to cough or have other breathing problems   Acute bronchitis often starts because of another illness, such as a cold or the flu  The illness spreads from your nose and throat to your windpipe and airways  Bronchitis is often called a chest cold  Acute bronchitis lasts about 3 to 6 weeks and is usually not a serious illness  Your cough can last for several weeks  You may have any of the following symptoms:   · A cough with sputum that may be clear, yellow, or green    · Feeling more tired than usual, and body aches    · A fever and chills    · Wheezing when you breathe    · A tight chest or pain when you breathe or cough  Seek care immediately if:   · You cough up blood  · Your lips or fingernails turn blue  · You feel like you are not getting enough air when you breathe  Contact your healthcare provider if:   · You have a fever  · Your breathing problems do not go away or get worse  · Your cough does not get better within 4 weeks  · You have questions or concerns about your condition or care  Self-care:   · Get more rest   Rest helps your body to heal  Slowly start to do more each day  Rest when you feel it is needed  · Avoid irritants in the air  Avoid chemicals, fumes, and dust  Wear a face mask if you must work around dust or fumes  Stay inside on days when air pollution levels are high  If you have allergies, stay inside when pollen counts are high  Do not use aerosol products, such as spray-on deodorant, bug spray, and hair spray  · Do not smoke or be around others who smoke  Nicotine and other chemicals in cigarettes and cigars damages the cilia that move mucus out of your lungs  Ask your healthcare provider for information if you currently smoke and need help to quit  E-cigarettes or smokeless tobacco still contain nicotine  Talk to your healthcare provider before you use these products  · Drink liquids as directed  Liquids help keep your air passages moist and help you cough up mucus  You may need to drink more liquids when you have acute bronchitis   Ask how much liquid to drink each day and which liquids are best for you  · Use a humidifier or vaporizer  Use a cool mist humidifier or a vaporizer to increase air moisture in your home  This may make it easier for you to breathe and help decrease your cough  Prevent acute bronchitis by doing the following:   · Get the vaccinations you need  Ask your healthcare provider if you should get vaccinated against the flu or pneumonia  · Prevent the spread of germs  You can decrease your risk of acute bronchitis and other illnesses by doing the following:     [facility] your hands often with soap and water  Carry germ-killing hand lotion or gel with you  You can use the lotion or gel to clean your hands when soap and water are not available  ¨ Do not touch your eyes, nose, or mouth unless you have washed your hands first     ¨ Always cover your mouth when you cough to prevent the spread of germs  It is best to cough into a tissue or your shirt sleeve instead of into your hand  Ask those around you cover their mouths when they cough  ¨ Try to avoid people who have a cold or the flu  If you are sick, stay away from others as much as possible  Medicines: Your healthcare provider may  give you any of the following:  · Ibuprofen or acetaminophen  are medicines that help lower your fever  They are available without a doctor's order  Ask your healthcare provider which medicine is right for you  Ask how much to take and how often to take it  Follow directions  These medicines can cause stomach bleeding if not taken correctly  Ibuprofen can cause kidney damage  Do not take ibuprofen if you have kidney disease, an ulcer, or allergies to aspirin  Acetaminophen can cause liver damage  Do not take more than 4,000 milligrams in 24 hours  · Decongestants  help loosen mucus in your lungs and make it easier to cough up  This can help you breathe easier  · Cough suppressants  decrease your urge to cough   If your cough produces mucus, do not take a cough suppressant unless your healthcare provider tells you to  Your healthcare provider may suggest that you take a cough suppressant at night so you can rest     · Inhalers  may be given  Your healthcare provider may give you one or more inhalers to help you breathe easier and cough less  An inhaler gives your medicine to open your airways  Ask your healthcare provider to show you how to use your inhaler correctly  Follow up with your healthcare provider as directed:  Write down questions you have so you will remember to ask them during your follow-up visits  © 2017 [facility] [facility] Information is for End User's use only and may not be sold, redistributed or otherwise used for commercial purposes  All illustrations and images included in CareNotes® are the copyrighted property of A D A [employer] Inc  or Rubens Gustafson  The above information is an  only  It is not intended as medical advice for individual conditions or treatments  Talk to your doctor, nurse or pharmacist before following any medical regimen to see if it is safe and effective for you

## 2020-08-04 LAB — SARS-COV-2 RNA SPEC QL NAA+PROBE: NOT DETECTED

## 2020-08-15 ENCOUNTER — TELEPHONE (OUTPATIENT)
Dept: URGENT CARE | Facility: CLINIC | Age: 35
End: 2020-08-15

## 2020-08-15 NOTE — TELEPHONE ENCOUNTER
Called and spoke to patient  Reviewed negative/normal covid-19 results which patient did see in the portal   Patient reports she is feeling much better

## 2020-12-27 ENCOUNTER — NURSE TRIAGE (OUTPATIENT)
Dept: OTHER | Facility: OTHER | Age: 35
End: 2020-12-27

## 2020-12-27 DIAGNOSIS — U07.1 COVID-19: Primary | ICD-10-CM

## 2020-12-28 DIAGNOSIS — U07.1 COVID-19: ICD-10-CM

## 2020-12-28 PROCEDURE — U0003 INFECTIOUS AGENT DETECTION BY NUCLEIC ACID (DNA OR RNA); SEVERE ACUTE RESPIRATORY SYNDROME CORONAVIRUS 2 (SARS-COV-2) (CORONAVIRUS DISEASE [COVID-19]), AMPLIFIED PROBE TECHNIQUE, MAKING USE OF HIGH THROUGHPUT TECHNOLOGIES AS DESCRIBED BY CMS-2020-01-R: HCPCS | Performed by: FAMILY MEDICINE

## 2020-12-29 LAB — SARS-COV-2 RNA SPEC QL NAA+PROBE: DETECTED

## 2021-01-01 ENCOUNTER — TELEPHONE (OUTPATIENT)
Dept: GASTROENTEROLOGY | Facility: CLINIC | Age: 36
End: 2021-01-01

## 2021-01-01 NOTE — TELEPHONE ENCOUNTER
The patient was called for notification of a test result for COVID-19  The patient did not answer the phone and a voicemail was left requesting a call back to [phone], Option 7

## 2021-01-03 ENCOUNTER — TELEPHONE (OUTPATIENT)
Dept: OTHER | Facility: OTHER | Age: 36
End: 2021-01-03

## 2021-01-03 NOTE — TELEPHONE ENCOUNTER
2nd attempt  The patient was called for notification of a test result for COVID-19  The patient did not answer the phone and a voicemail was left requesting a call back to [phone], Option 7

## 2021-04-16 ENCOUNTER — HOSPITAL ENCOUNTER (EMERGENCY)
Facility: HOSPITAL | Age: 36
Discharge: HOME | End: 2021-04-16
Attending: INTERNAL MEDICINE
Payer: COMMERCIAL

## 2021-04-16 ENCOUNTER — APPOINTMENT (EMERGENCY)
Dept: RADIOLOGY | Facility: HOSPITAL | Age: 36
End: 2021-04-16
Payer: COMMERCIAL

## 2021-04-16 VITALS
DIASTOLIC BLOOD PRESSURE: 71 MMHG | TEMPERATURE: 97.6 F | BODY MASS INDEX: 29.76 KG/M2 | WEIGHT: 168 LBS | SYSTOLIC BLOOD PRESSURE: 106 MMHG | OXYGEN SATURATION: 98 % | HEART RATE: 69 BPM | RESPIRATION RATE: 22 BRPM

## 2021-04-16 DIAGNOSIS — R07.89 ATYPICAL CHEST PAIN: Primary | ICD-10-CM

## 2021-04-16 LAB
ALBUMIN SERPL BCP-MCNC: 4.6 G/DL (ref 3.5–5.7)
ALP SERPL-CCNC: 46 U/L (ref 40–150)
ALT SERPL W P-5'-P-CCNC: 13 U/L (ref 7–52)
ANION GAP SERPL CALCULATED.3IONS-SCNC: 11 MMOL/L (ref 4–13)
AST SERPL W P-5'-P-CCNC: 14 U/L (ref 13–39)
BASOPHILS # BLD AUTO: 0.1 THOUSANDS/ΜL (ref 0–0.1)
BASOPHILS NFR BLD AUTO: 1 % (ref 0–2)
BILIRUB SERPL-MCNC: 1 MG/DL (ref 0.2–1)
BUN SERPL-MCNC: 15 MG/DL (ref 7–25)
CALCIUM SERPL-MCNC: 9.4 MG/DL (ref 8.6–10.5)
CHLORIDE SERPL-SCNC: 100 MMOL/L (ref 98–107)
CO2 SERPL-SCNC: 27 MMOL/L (ref 21–31)
CREAT SERPL-MCNC: 0.88 MG/DL (ref 0.6–1.2)
EOSINOPHIL # BLD AUTO: 0 THOUSAND/ΜL (ref 0–0.61)
EOSINOPHIL NFR BLD AUTO: 0 % (ref 0–5)
ERYTHROCYTE [DISTWIDTH] IN BLOOD BY AUTOMATED COUNT: 13 % (ref 11.5–14.5)
GFR SERPL CREATININE-BSD FRML MDRD: 86 ML/MIN/1.73SQ M
GLUCOSE SERPL-MCNC: 112 MG/DL (ref 65–99)
HCT VFR BLD AUTO: 41.4 % (ref 42–47)
HGB BLD-MCNC: 14.1 G/DL (ref 12–16)
LYMPHOCYTES # BLD AUTO: 3.1 THOUSANDS/ΜL (ref 0.6–4.47)
LYMPHOCYTES NFR BLD AUTO: 31 % (ref 21–51)
MCH RBC QN AUTO: 30.5 PG (ref 26–34)
MCHC RBC AUTO-ENTMCNC: 34.1 G/DL (ref 31–37)
MCV RBC AUTO: 90 FL (ref 81–99)
MONOCYTES # BLD AUTO: 0.6 THOUSAND/ΜL (ref 0.17–1.22)
MONOCYTES NFR BLD AUTO: 7 % (ref 2–12)
NEUTROPHILS # BLD AUTO: 6 THOUSANDS/ΜL (ref 1.4–6.5)
NEUTS SEG NFR BLD AUTO: 61 % (ref 42–75)
PLATELET # BLD AUTO: 214 THOUSANDS/UL (ref 149–390)
PMV BLD AUTO: 9 FL (ref 8.6–11.7)
POTASSIUM SERPL-SCNC: 3.5 MMOL/L (ref 3.5–5.5)
PROT SERPL-MCNC: 7.5 G/DL (ref 6.4–8.9)
RBC # BLD AUTO: 4.62 MILLION/UL (ref 3.9–5.2)
SODIUM SERPL-SCNC: 138 MMOL/L (ref 134–143)
TROPONIN I SERPL-MCNC: <0.03 NG/ML
WBC # BLD AUTO: 9.9 THOUSAND/UL (ref 4.8–10.8)

## 2021-04-16 PROCEDURE — 99285 EMERGENCY DEPT VISIT HI MDM: CPT

## 2021-04-16 PROCEDURE — 99285 EMERGENCY DEPT VISIT HI MDM: CPT | Performed by: INTERNAL MEDICINE

## 2021-04-16 PROCEDURE — 80053 COMPREHEN METABOLIC PANEL: CPT | Performed by: INTERNAL MEDICINE

## 2021-04-16 PROCEDURE — 96374 THER/PROPH/DIAG INJ IV PUSH: CPT

## 2021-04-16 PROCEDURE — 71045 X-RAY EXAM CHEST 1 VIEW: CPT

## 2021-04-16 PROCEDURE — 93005 ELECTROCARDIOGRAM TRACING: CPT

## 2021-04-16 PROCEDURE — 85025 COMPLETE CBC W/AUTO DIFF WBC: CPT | Performed by: INTERNAL MEDICINE

## 2021-04-16 PROCEDURE — C9113 INJ PANTOPRAZOLE SODIUM, VIA: HCPCS | Performed by: INTERNAL MEDICINE

## 2021-04-16 PROCEDURE — 36415 COLL VENOUS BLD VENIPUNCTURE: CPT | Performed by: INTERNAL MEDICINE

## 2021-04-16 PROCEDURE — 84484 ASSAY OF TROPONIN QUANT: CPT | Performed by: INTERNAL MEDICINE

## 2021-04-16 RX ORDER — OMEPRAZOLE 20 MG/1
20 CAPSULE, DELAYED RELEASE ORAL DAILY
Qty: 21 CAPSULE | Refills: 0 | Status: SHIPPED | OUTPATIENT
Start: 2021-04-16 | End: 2021-05-07

## 2021-04-16 RX ORDER — PANTOPRAZOLE SODIUM 40 MG/1
40 INJECTION, POWDER, FOR SOLUTION INTRAVENOUS ONCE
Status: COMPLETED | OUTPATIENT
Start: 2021-04-16 | End: 2021-04-16

## 2021-04-16 RX ADMIN — PANTOPRAZOLE SODIUM 40 MG: 40 INJECTION, POWDER, LYOPHILIZED, FOR SOLUTION INTRAVENOUS at 20:29

## 2021-04-17 LAB
ATRIAL RATE: 101 BPM
P AXIS: 71 DEGREES
PR INTERVAL: 158 MS
QRS AXIS: 79 DEGREES
QRSD INTERVAL: 76 MS
QT INTERVAL: 346 MS
QTC INTERVAL: 448 MS
T WAVE AXIS: 24 DEGREES
VENTRICULAR RATE: 101 BPM

## 2021-04-17 PROCEDURE — 93010 ELECTROCARDIOGRAM REPORT: CPT | Performed by: INTERNAL MEDICINE

## 2021-04-17 NOTE — ED PROVIDER NOTES
History  Chief Complaint   Patient presents with    Chest Pain     80-year-old female accompanied by her  presents emergency room with chief complaint of midsternal chest pain  The patient states the pain started while she was eating lunch in our card today at work she felt the 1st a sharp pain and any dullness in a pressure associated with some shortness of breath no nausea vomiting or diaphoresis  She felt as if her heart was pounding hard at that time she denies any lightheadedness or dizziness  The patient states he was coming in waves today and at home sitting in a chair and she had the discomfort again  She has recently seen by her PCP who is monitoring her for high blood pressure and requesting her monitor at home  She is a smoker 1 pack per day, she does not know if she has hyperlipidemia, she is not a diabetic  As far she knows there is no family history of heart disease  The patient does drink 4-5 beers a day  Patient rates her pain at its worst as an 8/10 and at this time she has no pain at all  Pain is nonradiating and felt better when she took deep breaths or pushed down on her chest           None       Past Medical History:   Diagnosis Date    ADHD (attention deficit hyperactivity disorder)     Bipolar 1 disorder ([facility] [address] )     Depression        Past Surgical History:   Procedure Laterality Date     SECTION         History reviewed  No pertinent family history  I have reviewed and agree with the history as documented      E-Cigarette/Vaping    E-Cigarette Use Current Every Day User      E-Cigarette/Vaping Substances    Nicotine Yes      Social History     Tobacco Use    Smoking status: Current Every Day Smoker     Types: E-Cigarettes    Smokeless tobacco: Never Used   Substance Use Topics    Alcohol use: Yes     Frequency: 4 or more times a week     Drinks per session: 3 or 4     Comment: 2 drinks/day    Drug use: Not Currently       Review of Systems   Constitutional: Negative  HENT: Negative  Respiratory: Negative  Cardiovascular: Positive for chest pain  Negative for palpitations and leg swelling  Gastrointestinal: Negative  Genitourinary: Negative  Musculoskeletal: Negative  Skin: Negative  Neurological: Negative  Hematological: Negative  Psychiatric/Behavioral: Negative  Physical Exam  Physical Exam  Vitals signs and nursing note reviewed  Constitutional:       General: She is not in acute distress  Appearance: She is well-developed  She is not ill-appearing, toxic-appearing or diaphoretic  HENT:      Head: Normocephalic and atraumatic  Neck:      Musculoskeletal: Normal range of motion and neck supple  Cardiovascular:      Rate and Rhythm: Normal rate and regular rhythm  Heart sounds: Normal heart sounds  Pulmonary:      Effort: Pulmonary effort is normal  No tachypnea, accessory muscle usage or respiratory distress  Breath sounds: No stridor  No decreased breath sounds  Chest:      Chest wall: No mass, deformity, tenderness, crepitus or edema  There is no dullness to percussion  Abdominal:      General: Bowel sounds are normal       Palpations: Abdomen is soft  Tenderness: There is abdominal tenderness  Comments: Patient mildly tender in the epigastric region  Musculoskeletal: Normal range of motion  Skin:     General: Skin is warm and dry  Neurological:      General: No focal deficit present  Mental Status: She is alert     Psychiatric:         Mood and Affect: Mood normal          Behavior: Behavior normal          Vital Signs  ED Triage Vitals [04/16/21 1950]   Temperature Pulse Respirations Blood Pressure SpO2   97 6 °F (36 4 °C) 87 18 135/84 100 %      Temp src Heart Rate Source Patient Position - Orthostatic VS BP Location FiO2 (%)   -- -- -- -- --      Pain Score       4           Vitals:    04/16/21 1950 04/16/21 2000 04/16/21 2100   BP: 135/84 126/68 106/71   Pulse: 87 76 69 Visual Acuity      ED Medications  Medications   pantoprazole (PROTONIX) injection 40 mg (40 mg Intravenous Given 4/16/21 2029)       Diagnostic Studies  Results Reviewed     Procedure Component Value Units Date/Time    Troponin I [[id number]]  (Normal) Collected: 04/16/21 2004    Lab Status: Final result Specimen: Blood from Arm, Right Updated: 04/16/21 2029     Troponin I <0 03 ng/mL     Comprehensive metabolic panel [[id number]]  (Abnormal) Collected: 04/16/21 2004    Lab Status: Final result Specimen: Blood from Arm, Right Updated: 04/16/21 2028     Sodium 138 mmol/L      Potassium 3 5 mmol/L      Chloride 100 mmol/L      CO2 27 mmol/L      ANION GAP 11 mmol/L      BUN 15 mg/dL      Creatinine 0 88 mg/dL      Glucose 112 mg/dL      Calcium 9 4 mg/dL      AST 14 U/L      ALT 13 U/L      Alkaline Phosphatase 46 U/L      Total Protein 7 5 g/dL      Albumin 4 6 g/dL      Total Bilirubin 1 00 mg/dL      eGFR 86 ml/min/1 73sq m     Narrative:      [place] guidelines for Chronic Kidney Disease (CKD):     Stage 1 with normal or high GFR (GFR > 90 mL/min/1 73 square meters)    Stage 2 Mild CKD (GFR = 60-89 mL/min/1 73 square meters)    Stage 3A Moderate CKD (GFR = 45-59 mL/min/1 73 square meters)    Stage 3B Moderate CKD (GFR = 30-44 mL/min/1 73 square meters)    Stage 4 Severe CKD (GFR = 15-29 mL/min/1 73 square meters)    Stage 5 End Stage CKD (GFR <15 mL/min/1 73 square meters)  Note: GFR calculation is accurate only with a steady state creatinine    CBC and differential [[id number]]  (Abnormal) Collected: 04/16/21 2004    Lab Status: Final result Specimen: Blood from Arm, Right Updated: 04/16/21 2013     WBC 9 90 Thousand/uL      RBC 4 62 Million/uL      Hemoglobin 14 1 g/dL      Hematocrit 41 4 %      MCV 90 fL      MCH 30 5 pg      MCHC 34 1 g/dL      RDW 13 0 %      MPV 9 0 fL      Platelets 810 Thousands/uL      Neutrophils Relative 61 %      Lymphocytes Relative 31 % Monocytes Relative 7 %      Eosinophils Relative 0 %      Basophils Relative 1 %      Neutrophils Absolute 6 00 Thousands/µL      Lymphocytes Absolute 3 10 Thousands/µL      Monocytes Absolute 0 60 Thousand/µL      Eosinophils Absolute 0 00 Thousand/µL      Basophils Absolute 0 10 Thousands/µL                  XR chest 1 view portable   ED Interpretation by Yamila Alba MD (04/16 2052)   Portable chest S x-ray shows no effusion infiltrate or pneumothorax  Normal heart border no evidence of cardiomegaly  Unremarkable chest x-ray                 Procedures  Procedures         ED Course                             SBIRT 20yo+      Most Recent Value   SBIRT (24 yo +)   In order to provide better care to our patients, we are screening all of our patients for alcohol and drug use  Would it be okay to ask you these screening questions? Yes Filed at: 04/16/2021 1953   Initial Alcohol Screen: US AUDIT-C    1  How often do you have a drink containing alcohol? 6 Filed at: 04/16/2021 1953   2  How many drinks containing alcohol do you have on a typical day you are drinking? 3 Filed at: 04/16/2021 1953   3a  Male UNDER 65: How often do you have five or more drinks on one occasion? 0 Filed at: 04/16/2021 1953   3b  FEMALE Any Age, or MALE 65+: How often do you have 4 or more drinks on one occassion? 1 Filed at: 04/16/2021 1953   Audit-C Score  (!) 10 Filed at: 04/16/2021 1953   Full Alcohol Screen: US AUDIT   4  How often during the last year have you found that you were not able to stop drinking once you had started? 0 Filed at: 04/16/2021 1953   5  How often during past year have you failed to do what was normally expected of you because of drinking? 0 Filed at: 04/16/2021 1953   6  How often in past year have you needed a first drink in the morning to get yourself going after a heavy drinking session? 0 Filed at: 04/16/2021 1953   7   How often in past year have you had feeling of guilt or remorse after drinking? 0 Filed at: 04/16/2021 1953   8  How often in past year have you been unable to remember what happened night before because you had been drinking? 0 Filed at: 04/16/2021 1953   9  Have you or someone else been injured as a result of your drinking? 0 Filed at: 04/16/2021 1953   10  Has a relative, friend, doctor or other health worker been concerned about your drinking and suggested you cut down?   0 Filed at: 04/16/2021 1953   AUDIT Total Score  10 Filed at: 04/16/2021 1953   MAXIMILIAN: How many times in the past year have you    Used an illegal drug or used a prescription medication for non-medical reasons? Never Filed at: 04/16/2021 1953                    MDM  Number of Diagnoses or Management Options  Diagnosis management comments: Reviewed patient's findings with her and her  at the bedside  Discussed possible etiology of the discomfort since it seems to be more positional possible hiatal hernia versus reflux  Recommended following up with their PCP Dr fadumo North  Final diagnoses:   Atypical chest pain     Time reflects when diagnosis was documented in both MDM as applicable and the Disposition within this note     Time User Action Codes Description Comment    4/16/2021  9:33 PM Rita Gutierrez Add [R07 89] Atypical chest pain       ED Disposition     ED Disposition Condition Date/Time Comment    Discharge Stable Fri Apr 16, 2021  9:33 PM [address] [place] [address] Road discharge to home/self care              Follow-up Information     Follow up With Specialties Details Why Yuriy 176, DO Family Medicine In 3 days  [address]  [phone]            Patient's Medications   Discharge Prescriptions    OMEPRAZOLE (PRILOSEC) 20 MG DELAYED RELEASE CAPSULE    Take 1 capsule (20 mg total) by mouth daily for 21 days       Start Date: 4/16/2021 End Date: 5/7/2021       Order Dose: 20 mg       Quantity: 21 capsule    Refills: 0     No discharge procedures on file      PDMP Review     None          ED Provider  Electronically Signed by           Erica Mcgee MD  04/16/21 0608